# Patient Record
Sex: FEMALE | Race: OTHER | HISPANIC OR LATINO | URBAN - METROPOLITAN AREA
[De-identification: names, ages, dates, MRNs, and addresses within clinical notes are randomized per-mention and may not be internally consistent; named-entity substitution may affect disease eponyms.]

---

## 2017-01-21 ENCOUNTER — EMERGENCY (EMERGENCY)
Facility: HOSPITAL | Age: 22
LOS: 1 days | Discharge: HOME | End: 2017-01-21
Attending: EMERGENCY MEDICINE
Payer: MEDICAID

## 2017-01-21 VITALS
RESPIRATION RATE: 16 BRPM | TEMPERATURE: 98 F | SYSTOLIC BLOOD PRESSURE: 104 MMHG | OXYGEN SATURATION: 100 % | HEART RATE: 68 BPM | DIASTOLIC BLOOD PRESSURE: 63 MMHG

## 2017-01-21 VITALS
HEART RATE: 70 BPM | RESPIRATION RATE: 18 BRPM | WEIGHT: 119.93 LBS | OXYGEN SATURATION: 98 % | DIASTOLIC BLOOD PRESSURE: 76 MMHG | TEMPERATURE: 98 F | SYSTOLIC BLOOD PRESSURE: 115 MMHG | HEIGHT: 62 IN

## 2017-01-21 DIAGNOSIS — K75.9 INFLAMMATORY LIVER DISEASE, UNSPECIFIED: ICD-10-CM

## 2017-01-21 DIAGNOSIS — K59.00 CONSTIPATION, UNSPECIFIED: ICD-10-CM

## 2017-01-21 LAB
ALBUMIN SERPL ELPH-MCNC: 3.9 G/DL — SIGNIFICANT CHANGE UP (ref 3.5–5)
ALP SERPL-CCNC: 134 U/L — HIGH (ref 40–120)
ALT FLD-CCNC: 755 U/L DA — HIGH (ref 10–60)
ANION GAP SERPL CALC-SCNC: 8 MMOL/L — SIGNIFICANT CHANGE UP (ref 5–17)
APAP SERPL-MCNC: <2 UG/ML — LOW (ref 10–30)
APPEARANCE UR: ABNORMAL
AST SERPL-CCNC: 1127 U/L — HIGH (ref 10–40)
BILIRUB DIRECT SERPL-MCNC: 0.7 MG/DL — HIGH (ref 0–0.2)
BILIRUB INDIRECT FLD-MCNC: 0.6 MG/DL — SIGNIFICANT CHANGE UP (ref 0.2–1)
BILIRUB SERPL-MCNC: 1.3 MG/DL — HIGH (ref 0.2–1.2)
BILIRUB UR-MCNC: ABNORMAL
BUN SERPL-MCNC: 9 MG/DL — SIGNIFICANT CHANGE UP (ref 7–18)
CALCIUM SERPL-MCNC: 8.8 MG/DL — SIGNIFICANT CHANGE UP (ref 8.4–10.5)
CHLORIDE SERPL-SCNC: 107 MMOL/L — SIGNIFICANT CHANGE UP (ref 96–108)
CO2 SERPL-SCNC: 25 MMOL/L — SIGNIFICANT CHANGE UP (ref 22–31)
COLOR SPEC: YELLOW — SIGNIFICANT CHANGE UP
CREAT SERPL-MCNC: 0.66 MG/DL — SIGNIFICANT CHANGE UP (ref 0.5–1.3)
DIFF PNL FLD: ABNORMAL
GLUCOSE SERPL-MCNC: 102 MG/DL — HIGH (ref 70–99)
GLUCOSE UR QL: NEGATIVE — SIGNIFICANT CHANGE UP
HAV IGM SER-ACNC: SIGNIFICANT CHANGE UP
HBV CORE IGM SER-ACNC: SIGNIFICANT CHANGE UP
HBV SURFACE AG SER-ACNC: SIGNIFICANT CHANGE UP
HCG UR QL: NEGATIVE — SIGNIFICANT CHANGE UP
HCT VFR BLD CALC: 36 % — SIGNIFICANT CHANGE UP (ref 34.5–45)
HCV AB S/CO SERPL IA: 0.14 S/CO — SIGNIFICANT CHANGE UP
HCV AB SERPL-IMP: SIGNIFICANT CHANGE UP
HGB BLD-MCNC: 12.2 G/DL — SIGNIFICANT CHANGE UP (ref 11.5–15.5)
KETONES UR-MCNC: NEGATIVE — SIGNIFICANT CHANGE UP
LEUKOCYTE ESTERASE UR-ACNC: ABNORMAL
LIDOCAIN IGE QN: 203 U/L — SIGNIFICANT CHANGE UP (ref 73–393)
MCHC RBC-ENTMCNC: 31.5 PG — SIGNIFICANT CHANGE UP (ref 27–34)
MCHC RBC-ENTMCNC: 33.8 GM/DL — SIGNIFICANT CHANGE UP (ref 32–36)
MCV RBC AUTO: 93.2 FL — SIGNIFICANT CHANGE UP (ref 80–100)
NITRITE UR-MCNC: NEGATIVE — SIGNIFICANT CHANGE UP
PH UR: 6 — SIGNIFICANT CHANGE UP (ref 4.8–8)
PLATELET # BLD AUTO: 202 K/UL — SIGNIFICANT CHANGE UP (ref 150–400)
POTASSIUM SERPL-MCNC: 3.7 MMOL/L — SIGNIFICANT CHANGE UP (ref 3.5–5.3)
POTASSIUM SERPL-SCNC: 3.7 MMOL/L — SIGNIFICANT CHANGE UP (ref 3.5–5.3)
PROT SERPL-MCNC: 6.8 G/DL — SIGNIFICANT CHANGE UP (ref 6–8.3)
PROT UR-MCNC: NEGATIVE — SIGNIFICANT CHANGE UP
RBC # BLD: 3.87 M/UL — SIGNIFICANT CHANGE UP (ref 3.8–5.2)
RBC # FLD: 11.3 % — SIGNIFICANT CHANGE UP (ref 10.3–14.5)
SODIUM SERPL-SCNC: 140 MMOL/L — SIGNIFICANT CHANGE UP (ref 135–145)
SP GR SPEC: 1.02 — SIGNIFICANT CHANGE UP (ref 1.01–1.02)
UROBILINOGEN FLD QL: 8
WBC # BLD: 4.7 K/UL — SIGNIFICANT CHANGE UP (ref 3.8–10.5)
WBC # FLD AUTO: 4.7 K/UL — SIGNIFICANT CHANGE UP (ref 3.8–10.5)

## 2017-01-21 PROCEDURE — 80074 ACUTE HEPATITIS PANEL: CPT

## 2017-01-21 PROCEDURE — 81025 URINE PREGNANCY TEST: CPT

## 2017-01-21 PROCEDURE — 96375 TX/PRO/DX INJ NEW DRUG ADDON: CPT

## 2017-01-21 PROCEDURE — 96374 THER/PROPH/DIAG INJ IV PUSH: CPT

## 2017-01-21 PROCEDURE — 81001 URINALYSIS AUTO W/SCOPE: CPT

## 2017-01-21 PROCEDURE — 80048 BASIC METABOLIC PNL TOTAL CA: CPT

## 2017-01-21 PROCEDURE — 99284 EMERGENCY DEPT VISIT MOD MDM: CPT | Mod: 25

## 2017-01-21 PROCEDURE — 80307 DRUG TEST PRSMV CHEM ANLYZR: CPT

## 2017-01-21 PROCEDURE — 83690 ASSAY OF LIPASE: CPT

## 2017-01-21 PROCEDURE — 85027 COMPLETE CBC AUTOMATED: CPT

## 2017-01-21 PROCEDURE — 80076 HEPATIC FUNCTION PANEL: CPT

## 2017-01-21 RX ORDER — SODIUM CHLORIDE 9 MG/ML
1000 INJECTION INTRAMUSCULAR; INTRAVENOUS; SUBCUTANEOUS ONCE
Qty: 0 | Refills: 0 | Status: COMPLETED | OUTPATIENT
Start: 2017-01-21 | End: 2017-01-21

## 2017-01-21 RX ORDER — KETOROLAC TROMETHAMINE 30 MG/ML
30 SYRINGE (ML) INJECTION ONCE
Qty: 0 | Refills: 0 | Status: DISCONTINUED | OUTPATIENT
Start: 2017-01-21 | End: 2017-01-21

## 2017-01-21 RX ORDER — ONDANSETRON 8 MG/1
4 TABLET, FILM COATED ORAL ONCE
Qty: 0 | Refills: 0 | Status: COMPLETED | OUTPATIENT
Start: 2017-01-21 | End: 2017-01-21

## 2017-01-21 RX ADMIN — SODIUM CHLORIDE 4000 MILLILITER(S): 9 INJECTION INTRAMUSCULAR; INTRAVENOUS; SUBCUTANEOUS at 08:02

## 2017-01-21 RX ADMIN — ONDANSETRON 4 MILLIGRAM(S): 8 TABLET, FILM COATED ORAL at 05:50

## 2017-01-21 RX ADMIN — Medication 30 MILLIGRAM(S): at 08:03

## 2017-01-21 RX ADMIN — Medication 30 MILLIGRAM(S): at 05:50

## 2017-01-21 RX ADMIN — SODIUM CHLORIDE 4000 MILLILITER(S): 9 INJECTION INTRAMUSCULAR; INTRAVENOUS; SUBCUTANEOUS at 05:50

## 2017-01-21 NOTE — ED PROVIDER NOTE - OBJECTIVE STATEMENT
20 y/o F pt p/w constant abd pain radiating to back and constipation x2 days. Pt also reports nausea and vomiting and denies fever.

## 2017-01-21 NOTE — ED ADULT NURSE NOTE - OBJECTIVE STATEMENT
presented  to ed  with  abd pain radiating to back and constipation x2 days.bld.drawn and sent to lab.

## 2017-01-21 NOTE — ED ADULT TRIAGE NOTE - CHIEF COMPLAINT QUOTE
as per pt abdominal pain for 3 days,was seen by pmd 2 days ago prescrbed pepcid,but pain is getting worse,nausea no vomiting

## 2017-01-21 NOTE — ED PROVIDER NOTE - NS ED MD SCRIBE ATTENDING SCRIBE SECTIONS
PAST MEDICAL/SURGICAL/SOCIAL HISTORY/HIV/REVIEW OF SYSTEMS/HISTORY OF PRESENT ILLNESS/DISPOSITION/PHYSICAL EXAM/VITAL SIGNS( Pullset)

## 2017-01-21 NOTE — ED PROVIDER NOTE - MEDICAL DECISION MAKING DETAILS
acute hepatitis, patient nontender, well appearing, able to po hydrate. hep panel pending and will follow up. dc with anticipatory guidance and outpt follow up and return precautions.

## 2017-08-20 ENCOUNTER — EMERGENCY (EMERGENCY)
Facility: HOSPITAL | Age: 22
LOS: 1 days | Discharge: HOME | End: 2017-08-20
Attending: EMERGENCY MEDICINE
Payer: MEDICAID

## 2017-08-20 VITALS
HEIGHT: 62 IN | OXYGEN SATURATION: 100 % | WEIGHT: 110.01 LBS | DIASTOLIC BLOOD PRESSURE: 70 MMHG | SYSTOLIC BLOOD PRESSURE: 116 MMHG | RESPIRATION RATE: 16 BRPM | HEART RATE: 89 BPM | TEMPERATURE: 100 F

## 2017-08-20 DIAGNOSIS — N30.00 ACUTE CYSTITIS WITHOUT HEMATURIA: ICD-10-CM

## 2017-08-20 LAB
ALBUMIN SERPL ELPH-MCNC: 3.5 G/DL — SIGNIFICANT CHANGE UP (ref 3.5–5)
ALP SERPL-CCNC: 65 U/L — SIGNIFICANT CHANGE UP (ref 40–120)
ALT FLD-CCNC: 15 U/L DA — SIGNIFICANT CHANGE UP (ref 10–60)
ANION GAP SERPL CALC-SCNC: 8 MMOL/L — SIGNIFICANT CHANGE UP (ref 5–17)
APPEARANCE UR: CLEAR — SIGNIFICANT CHANGE UP
AST SERPL-CCNC: 16 U/L — SIGNIFICANT CHANGE UP (ref 10–40)
BILIRUB SERPL-MCNC: 0.5 MG/DL — SIGNIFICANT CHANGE UP (ref 0.2–1.2)
BILIRUB UR-MCNC: NEGATIVE — SIGNIFICANT CHANGE UP
BUN SERPL-MCNC: 6 MG/DL — LOW (ref 7–18)
CALCIUM SERPL-MCNC: 8.7 MG/DL — SIGNIFICANT CHANGE UP (ref 8.4–10.5)
CHLORIDE SERPL-SCNC: 107 MMOL/L — SIGNIFICANT CHANGE UP (ref 96–108)
CO2 SERPL-SCNC: 25 MMOL/L — SIGNIFICANT CHANGE UP (ref 22–31)
COLOR SPEC: YELLOW — SIGNIFICANT CHANGE UP
CREAT SERPL-MCNC: 0.64 MG/DL — SIGNIFICANT CHANGE UP (ref 0.5–1.3)
DIFF PNL FLD: ABNORMAL
GLUCOSE SERPL-MCNC: 104 MG/DL — HIGH (ref 70–99)
GLUCOSE UR QL: NEGATIVE — SIGNIFICANT CHANGE UP
HCG UR QL: NEGATIVE — SIGNIFICANT CHANGE UP
HCT VFR BLD CALC: 34.6 % — SIGNIFICANT CHANGE UP (ref 34.5–45)
HGB BLD-MCNC: 11.8 G/DL — SIGNIFICANT CHANGE UP (ref 11.5–15.5)
KETONES UR-MCNC: NEGATIVE — SIGNIFICANT CHANGE UP
LACTATE SERPL-SCNC: 1.2 MMOL/L — SIGNIFICANT CHANGE UP (ref 0.7–2)
LEUKOCYTE ESTERASE UR-ACNC: ABNORMAL
MCHC RBC-ENTMCNC: 31.9 PG — SIGNIFICANT CHANGE UP (ref 27–34)
MCHC RBC-ENTMCNC: 34.1 GM/DL — SIGNIFICANT CHANGE UP (ref 32–36)
MCV RBC AUTO: 93.7 FL — SIGNIFICANT CHANGE UP (ref 80–100)
NITRITE UR-MCNC: POSITIVE
PH UR: 6.5 — SIGNIFICANT CHANGE UP (ref 5–8)
PLATELET # BLD AUTO: 165 K/UL — SIGNIFICANT CHANGE UP (ref 150–400)
POTASSIUM SERPL-MCNC: 3.7 MMOL/L — SIGNIFICANT CHANGE UP (ref 3.5–5.3)
POTASSIUM SERPL-SCNC: 3.7 MMOL/L — SIGNIFICANT CHANGE UP (ref 3.5–5.3)
PROT SERPL-MCNC: 6.8 G/DL — SIGNIFICANT CHANGE UP (ref 6–8.3)
PROT UR-MCNC: 30 MG/DL
RBC # BLD: 3.69 M/UL — LOW (ref 3.8–5.2)
RBC # FLD: 10.5 % — SIGNIFICANT CHANGE UP (ref 10.3–14.5)
SODIUM SERPL-SCNC: 140 MMOL/L — SIGNIFICANT CHANGE UP (ref 135–145)
SP GR SPEC: 1.01 — SIGNIFICANT CHANGE UP (ref 1.01–1.02)
UROBILINOGEN FLD QL: NEGATIVE — SIGNIFICANT CHANGE UP
WBC # BLD: 14.2 K/UL — HIGH (ref 3.8–10.5)
WBC # FLD AUTO: 14.2 K/UL — HIGH (ref 3.8–10.5)

## 2017-08-20 PROCEDURE — 76830 TRANSVAGINAL US NON-OB: CPT | Mod: 26

## 2017-08-20 PROCEDURE — 99285 EMERGENCY DEPT VISIT HI MDM: CPT

## 2017-08-20 PROCEDURE — 76856 US EXAM PELVIC COMPLETE: CPT | Mod: 26

## 2017-08-20 RX ORDER — ONDANSETRON 8 MG/1
4 TABLET, FILM COATED ORAL ONCE
Qty: 0 | Refills: 0 | Status: COMPLETED | OUTPATIENT
Start: 2017-08-20 | End: 2017-08-20

## 2017-08-20 RX ORDER — CEFTRIAXONE 500 MG/1
INJECTION, POWDER, FOR SOLUTION INTRAMUSCULAR; INTRAVENOUS
Qty: 0 | Refills: 0 | Status: DISCONTINUED | OUTPATIENT
Start: 2017-08-21 | End: 2017-08-24

## 2017-08-20 RX ORDER — SODIUM CHLORIDE 9 MG/ML
1000 INJECTION INTRAMUSCULAR; INTRAVENOUS; SUBCUTANEOUS ONCE
Qty: 0 | Refills: 0 | Status: COMPLETED | OUTPATIENT
Start: 2017-08-20 | End: 2017-08-20

## 2017-08-20 RX ORDER — KETOROLAC TROMETHAMINE 30 MG/ML
15 SYRINGE (ML) INJECTION ONCE
Qty: 0 | Refills: 0 | Status: DISCONTINUED | OUTPATIENT
Start: 2017-08-20 | End: 2017-08-20

## 2017-08-20 RX ADMIN — ONDANSETRON 4 MILLIGRAM(S): 8 TABLET, FILM COATED ORAL at 22:37

## 2017-08-20 RX ADMIN — SODIUM CHLORIDE 1000 MILLILITER(S): 9 INJECTION INTRAMUSCULAR; INTRAVENOUS; SUBCUTANEOUS at 22:37

## 2017-08-20 NOTE — ED PROVIDER NOTE - MEDICAL DECISION MAKING DETAILS
23 y/o F with no PMHx. Concern for cystitis vs. ovarian pelvic etiology. Most likely diverticulitis, less likely appendicitis, std, toa. Will order CBC, BMP, LFTs, lactate, UA, urine, will do pelvic exam, reassess.

## 2017-08-20 NOTE — ED PROVIDER NOTE - MUSCULOSKELETAL, MLM
(-) no swelling. Spine appears normal, range of motion is not limited, no muscle or joint tenderness

## 2017-08-20 NOTE — ED PROVIDER NOTE - OBJECTIVE STATEMENT
21 y/o female (A0) with no significant PMHx presents to the ED c/o fever, nausea and vomiting x 5 days. Pt notes 3 emesis episodes yesterday, and 1 episode  today: described as NB/NB in nature.  Pt reports L sided abd pain: described as intermittent, aching in nature radiating to back, rated as 8/10 in severity. Pt denies recent outside [place] travels, sick contacts, recent Abx use, or known spoiled food consumption. Pt denies dizziness, blood in stool, melena, diarrhea, dysuria, vaginal bleeding/discharge, pain during intercourse, or any other complaints. Current smoker, denies ETOH/drug use. LMP: 17.

## 2017-08-20 NOTE — ED PROVIDER NOTE - PROGRESS NOTE DETAILS
patient symptoms improved.  likely uti. offered ct for possible stone.  had discussion with patient and mother regarding radiation and other risks.  Patient deferred at this time.  Advised patient that if she feels worse tomorrow would need to come back for a ct.  will dc with strict return precautions patient received ct, no obvious findings.  patient symptoms improved.  will dc with close followup

## 2017-08-21 VITALS
TEMPERATURE: 101 F | SYSTOLIC BLOOD PRESSURE: 117 MMHG | RESPIRATION RATE: 16 BRPM | HEART RATE: 97 BPM | DIASTOLIC BLOOD PRESSURE: 71 MMHG | OXYGEN SATURATION: 100 %

## 2017-08-21 PROCEDURE — 74176 CT ABD & PELVIS W/O CONTRAST: CPT

## 2017-08-21 PROCEDURE — 83605 ASSAY OF LACTIC ACID: CPT

## 2017-08-21 PROCEDURE — 87086 URINE CULTURE/COLONY COUNT: CPT

## 2017-08-21 PROCEDURE — 87040 BLOOD CULTURE FOR BACTERIA: CPT

## 2017-08-21 PROCEDURE — 87186 SC STD MICRODIL/AGAR DIL: CPT

## 2017-08-21 PROCEDURE — 96375 TX/PRO/DX INJ NEW DRUG ADDON: CPT

## 2017-08-21 PROCEDURE — 76830 TRANSVAGINAL US NON-OB: CPT

## 2017-08-21 PROCEDURE — 99284 EMERGENCY DEPT VISIT MOD MDM: CPT | Mod: 25

## 2017-08-21 PROCEDURE — 96374 THER/PROPH/DIAG INJ IV PUSH: CPT

## 2017-08-21 PROCEDURE — 80053 COMPREHEN METABOLIC PANEL: CPT

## 2017-08-21 PROCEDURE — 85027 COMPLETE CBC AUTOMATED: CPT

## 2017-08-21 PROCEDURE — 74176 CT ABD & PELVIS W/O CONTRAST: CPT | Mod: 26

## 2017-08-21 PROCEDURE — 76856 US EXAM PELVIC COMPLETE: CPT

## 2017-08-21 PROCEDURE — 87591 N.GONORRHOEAE DNA AMP PROB: CPT

## 2017-08-21 PROCEDURE — 81025 URINE PREGNANCY TEST: CPT

## 2017-08-21 PROCEDURE — 81001 URINALYSIS AUTO W/SCOPE: CPT

## 2017-08-21 RX ORDER — CEFTRIAXONE 500 MG/1
1 INJECTION, POWDER, FOR SOLUTION INTRAMUSCULAR; INTRAVENOUS EVERY 24 HOURS
Qty: 0 | Refills: 0 | Status: DISCONTINUED | OUTPATIENT
Start: 2017-08-22 | End: 2017-08-24

## 2017-08-21 RX ORDER — CEFTRIAXONE 500 MG/1
1 INJECTION, POWDER, FOR SOLUTION INTRAMUSCULAR; INTRAVENOUS ONCE
Qty: 0 | Refills: 0 | Status: COMPLETED | OUTPATIENT
Start: 2017-08-21 | End: 2017-08-21

## 2017-08-21 RX ORDER — KETOROLAC TROMETHAMINE 30 MG/ML
15 SYRINGE (ML) INJECTION ONCE
Qty: 0 | Refills: 0 | Status: DISCONTINUED | OUTPATIENT
Start: 2017-08-21 | End: 2017-08-21

## 2017-08-21 RX ORDER — ACETAMINOPHEN 500 MG
650 TABLET ORAL ONCE
Qty: 0 | Refills: 0 | Status: COMPLETED | OUTPATIENT
Start: 2017-08-21 | End: 2017-08-21

## 2017-08-21 RX ORDER — AZTREONAM 2 G
1 VIAL (EA) INJECTION
Qty: 14 | Refills: 0
Start: 2017-08-21 | End: 2017-08-28

## 2017-08-21 RX ADMIN — Medication 650 MILLIGRAM(S): at 03:45

## 2017-08-21 RX ADMIN — Medication 15 MILLIGRAM(S): at 00:40

## 2017-08-21 RX ADMIN — Medication 15 MILLIGRAM(S): at 01:21

## 2017-08-21 RX ADMIN — CEFTRIAXONE 100 GRAM(S): 500 INJECTION, POWDER, FOR SOLUTION INTRAMUSCULAR; INTRAVENOUS at 00:40

## 2017-08-21 NOTE — ED ADULT NURSE REASSESSMENT NOTE - NS ED NURSE REASSESS COMMENT FT1
Pt. is febrile but stated that she feels fine and wants to go home. Pt. discharged by Dr. Novak. No complaints voiced. Pt. accompanied by mother.

## 2017-08-22 LAB
N GONORRHOEA RRNA SPEC QL NAA+PROBE: SIGNIFICANT CHANGE UP
SPECIMEN SOURCE: SIGNIFICANT CHANGE UP

## 2017-08-26 LAB
CULTURE RESULTS: SIGNIFICANT CHANGE UP
SPECIMEN SOURCE: SIGNIFICANT CHANGE UP

## 2018-02-04 ENCOUNTER — EMERGENCY (EMERGENCY)
Facility: HOSPITAL | Age: 23
LOS: 1 days | Discharge: HOME | End: 2018-02-04
Attending: EMERGENCY MEDICINE
Payer: MEDICAID

## 2018-02-04 VITALS
TEMPERATURE: 97 F | DIASTOLIC BLOOD PRESSURE: 75 MMHG | RESPIRATION RATE: 20 BRPM | OXYGEN SATURATION: 100 % | HEART RATE: 63 BPM | SYSTOLIC BLOOD PRESSURE: 111 MMHG

## 2018-02-04 LAB
ALBUMIN SERPL ELPH-MCNC: 4.7 G/DL — SIGNIFICANT CHANGE UP (ref 3.5–5)
ALP SERPL-CCNC: 65 U/L — SIGNIFICANT CHANGE UP (ref 40–120)
ALT FLD-CCNC: 34 U/L DA — SIGNIFICANT CHANGE UP (ref 10–60)
ANION GAP SERPL CALC-SCNC: 8 MMOL/L — SIGNIFICANT CHANGE UP (ref 5–17)
APPEARANCE UR: CLEAR — SIGNIFICANT CHANGE UP
AST SERPL-CCNC: 69 U/L — HIGH (ref 10–40)
BASOPHILS # BLD AUTO: 0.1 K/UL — SIGNIFICANT CHANGE UP (ref 0–0.2)
BASOPHILS NFR BLD AUTO: 0.6 % — SIGNIFICANT CHANGE UP (ref 0–2)
BILIRUB SERPL-MCNC: 1.1 MG/DL — SIGNIFICANT CHANGE UP (ref 0.2–1.2)
BILIRUB UR-MCNC: NEGATIVE — SIGNIFICANT CHANGE UP
BUN SERPL-MCNC: 16 MG/DL — SIGNIFICANT CHANGE UP (ref 7–18)
CALCIUM SERPL-MCNC: 9.5 MG/DL — SIGNIFICANT CHANGE UP (ref 8.4–10.5)
CHLORIDE SERPL-SCNC: 105 MMOL/L — SIGNIFICANT CHANGE UP (ref 96–108)
CO2 SERPL-SCNC: 25 MMOL/L — SIGNIFICANT CHANGE UP (ref 22–31)
COLOR SPEC: YELLOW — SIGNIFICANT CHANGE UP
CREAT SERPL-MCNC: 0.89 MG/DL — SIGNIFICANT CHANGE UP (ref 0.5–1.3)
DIFF PNL FLD: NEGATIVE — SIGNIFICANT CHANGE UP
EOSINOPHIL # BLD AUTO: 0 K/UL — SIGNIFICANT CHANGE UP (ref 0–0.5)
EOSINOPHIL NFR BLD AUTO: 0.3 % — SIGNIFICANT CHANGE UP (ref 0–6)
GLUCOSE SERPL-MCNC: 100 MG/DL — HIGH (ref 70–99)
GLUCOSE UR QL: NEGATIVE — SIGNIFICANT CHANGE UP
HCG SERPL-ACNC: <1 MIU/ML — SIGNIFICANT CHANGE UP
HCT VFR BLD CALC: 40.1 % — SIGNIFICANT CHANGE UP (ref 34.5–45)
HGB BLD-MCNC: 13.6 G/DL — SIGNIFICANT CHANGE UP (ref 11.5–15.5)
KETONES UR-MCNC: ABNORMAL
LEUKOCYTE ESTERASE UR-ACNC: ABNORMAL
LIDOCAIN IGE QN: 155 U/L — SIGNIFICANT CHANGE UP (ref 73–393)
LYMPHOCYTES # BLD AUTO: 1.6 K/UL — SIGNIFICANT CHANGE UP (ref 1–3.3)
LYMPHOCYTES # BLD AUTO: 14.2 % — SIGNIFICANT CHANGE UP (ref 13–44)
MCHC RBC-ENTMCNC: 32.1 PG — SIGNIFICANT CHANGE UP (ref 27–34)
MCHC RBC-ENTMCNC: 33.8 GM/DL — SIGNIFICANT CHANGE UP (ref 32–36)
MCV RBC AUTO: 95 FL — SIGNIFICANT CHANGE UP (ref 80–100)
MONOCYTES # BLD AUTO: 0.6 K/UL — SIGNIFICANT CHANGE UP (ref 0–0.9)
MONOCYTES NFR BLD AUTO: 5.6 % — SIGNIFICANT CHANGE UP (ref 2–14)
NEUTROPHILS # BLD AUTO: 8.8 K/UL — HIGH (ref 1.8–7.4)
NEUTROPHILS NFR BLD AUTO: 79.3 % — HIGH (ref 43–77)
NITRITE UR-MCNC: NEGATIVE — SIGNIFICANT CHANGE UP
PH UR: 8 — SIGNIFICANT CHANGE UP (ref 5–8)
PLATELET # BLD AUTO: 217 K/UL — SIGNIFICANT CHANGE UP (ref 150–400)
POTASSIUM SERPL-MCNC: 3.8 MMOL/L — SIGNIFICANT CHANGE UP (ref 3.5–5.3)
POTASSIUM SERPL-SCNC: 3.8 MMOL/L — SIGNIFICANT CHANGE UP (ref 3.5–5.3)
PROT SERPL-MCNC: 7.9 G/DL — SIGNIFICANT CHANGE UP (ref 6–8.3)
PROT UR-MCNC: 15
RBC # BLD: 4.22 M/UL — SIGNIFICANT CHANGE UP (ref 3.8–5.2)
RBC # FLD: 11.3 % — SIGNIFICANT CHANGE UP (ref 10.3–14.5)
SODIUM SERPL-SCNC: 138 MMOL/L — SIGNIFICANT CHANGE UP (ref 135–145)
SP GR SPEC: 1.01 — SIGNIFICANT CHANGE UP (ref 1.01–1.02)
UROBILINOGEN FLD QL: 4
WBC # BLD: 11 K/UL — HIGH (ref 3.8–10.5)
WBC # FLD AUTO: 11 K/UL — HIGH (ref 3.8–10.5)

## 2018-02-04 PROCEDURE — 84702 CHORIONIC GONADOTROPIN TEST: CPT

## 2018-02-04 PROCEDURE — 85027 COMPLETE CBC AUTOMATED: CPT

## 2018-02-04 PROCEDURE — 81001 URINALYSIS AUTO W/SCOPE: CPT

## 2018-02-04 PROCEDURE — 76705 ECHO EXAM OF ABDOMEN: CPT | Mod: 26

## 2018-02-04 PROCEDURE — 76705 ECHO EXAM OF ABDOMEN: CPT

## 2018-02-04 PROCEDURE — 80053 COMPREHEN METABOLIC PANEL: CPT

## 2018-02-04 PROCEDURE — 96375 TX/PRO/DX INJ NEW DRUG ADDON: CPT

## 2018-02-04 PROCEDURE — 99283 EMERGENCY DEPT VISIT LOW MDM: CPT

## 2018-02-04 PROCEDURE — 96374 THER/PROPH/DIAG INJ IV PUSH: CPT

## 2018-02-04 PROCEDURE — 99284 EMERGENCY DEPT VISIT MOD MDM: CPT | Mod: 25

## 2018-02-04 PROCEDURE — 83690 ASSAY OF LIPASE: CPT

## 2018-02-04 RX ORDER — ONDANSETRON 8 MG/1
4 TABLET, FILM COATED ORAL ONCE
Qty: 0 | Refills: 0 | Status: COMPLETED | OUTPATIENT
Start: 2018-02-04 | End: 2018-02-04

## 2018-02-04 RX ORDER — MORPHINE SULFATE 50 MG/1
4 CAPSULE, EXTENDED RELEASE ORAL ONCE
Qty: 0 | Refills: 0 | Status: DISCONTINUED | OUTPATIENT
Start: 2018-02-04 | End: 2018-02-04

## 2018-02-04 RX ORDER — SODIUM CHLORIDE 9 MG/ML
1000 INJECTION INTRAMUSCULAR; INTRAVENOUS; SUBCUTANEOUS ONCE
Qty: 0 | Refills: 0 | Status: COMPLETED | OUTPATIENT
Start: 2018-02-04 | End: 2018-02-04

## 2018-02-04 RX ORDER — KETOROLAC TROMETHAMINE 30 MG/ML
30 SYRINGE (ML) INJECTION ONCE
Qty: 0 | Refills: 0 | Status: DISCONTINUED | OUTPATIENT
Start: 2018-02-04 | End: 2018-02-04

## 2018-02-04 RX ADMIN — MORPHINE SULFATE 4 MILLIGRAM(S): 50 CAPSULE, EXTENDED RELEASE ORAL at 17:50

## 2018-02-04 RX ADMIN — MORPHINE SULFATE 4 MILLIGRAM(S): 50 CAPSULE, EXTENDED RELEASE ORAL at 17:17

## 2018-02-04 RX ADMIN — SODIUM CHLORIDE 1000 MILLILITER(S): 9 INJECTION INTRAMUSCULAR; INTRAVENOUS; SUBCUTANEOUS at 17:17

## 2018-02-04 RX ADMIN — ONDANSETRON 4 MILLIGRAM(S): 8 TABLET, FILM COATED ORAL at 17:16

## 2018-02-04 NOTE — ED ADULT NURSE NOTE - OBJECTIVE STATEMENT
pt a&ox3, here with c/o abdominal pain. pt states midabdominal pain since yesterday. pt loud and tearful in ED. 20G placed by SO Boykin and medicated. due for CT abdomen.

## 2018-02-04 NOTE — ED PROVIDER NOTE - MEDICAL DECISION MAKING DETAILS
23 y/o F pt with abd pain and h/o stones. Will obtain labs and CT. Will control pain, give IV fluids, and reassess. 23 y/o F pt with abd pain and h/o kidney stones.  Will control pain, give IV fluids, and reassess.  pain resolved. sono reveals cholelithiasis. home with primary care physician fredy

## 2018-02-04 NOTE — ED PROVIDER NOTE - OBJECTIVE STATEMENT
23 y/o F pt with no significant PMHx presents to ED c/o sudden onset of R sided abd pain, R sided back pain and vomiting x 1 day. Pt denies diarrhea, fever, chills, or any other complaints. LMP 1 week ago.

## 2018-02-04 NOTE — ED ADULT NURSE NOTE - ED STAT RN HANDOFF DETAILS
endorsed to SO Leslie in G2 in stable condition for continuation of care. pt a&ox3, here for abdominal pain. 20G RAC. pending MD dispo.

## 2018-08-28 ENCOUNTER — APPOINTMENT (OUTPATIENT)
Dept: OBGYN | Facility: CLINIC | Age: 23
End: 2018-08-28

## 2019-01-19 ENCOUNTER — TRANSCRIPTION ENCOUNTER (OUTPATIENT)
Age: 24
End: 2019-01-19

## 2021-07-06 ENCOUNTER — EMERGENCY (EMERGENCY)
Facility: HOSPITAL | Age: 26
LOS: 1 days | Discharge: LEFT BEFORE DISCHARGE | End: 2021-07-06
Attending: EMERGENCY MEDICINE
Payer: MEDICAID

## 2021-07-06 VITALS
HEART RATE: 82 BPM | WEIGHT: 145.06 LBS | HEIGHT: 62 IN | OXYGEN SATURATION: 96 % | RESPIRATION RATE: 17 BRPM | DIASTOLIC BLOOD PRESSURE: 73 MMHG | TEMPERATURE: 97 F | SYSTOLIC BLOOD PRESSURE: 115 MMHG

## 2021-07-06 PROCEDURE — 99284 EMERGENCY DEPT VISIT MOD MDM: CPT

## 2021-07-06 PROCEDURE — 99285 EMERGENCY DEPT VISIT HI MDM: CPT

## 2021-07-06 RX ORDER — SODIUM CHLORIDE 9 MG/ML
1000 INJECTION INTRAMUSCULAR; INTRAVENOUS; SUBCUTANEOUS ONCE
Refills: 0 | Status: COMPLETED | OUTPATIENT
Start: 2021-07-06 | End: 2021-07-06

## 2021-07-06 RX ORDER — NALOXONE HYDROCHLORIDE 4 MG/.1ML
4 SPRAY NASAL
Qty: 0.1 | Refills: 0
Start: 2021-07-06

## 2021-07-06 NOTE — ED PROVIDER NOTE - PATIENT PORTAL LINK FT
You can access the FollowMyHealth Patient Portal offered by [facility] by registering at the following website: [email or web address]. By joining [employer]’s FollowMyHealth portal, you will also be able to view your health information using other applications (apps) compatible with our system.

## 2021-07-06 NOTE — ED PROVIDER NOTE - NSFOLLOWUPINSTRUCTIONS_ED_ALL_ED_FT
Please follow up with your primary care doctor in 1-2 days.  Please use the narcan if she becomes poorly responsive or not breathing well.  Please return to the emergency department if you have shortness of breath, change in mental status, vomiting, fever, or any other symptoms.      Intentional Drug Overdose       An intentional drug overdose refers to taking too much of a drug to get high or for the purpose of harming yourself. An overdose can occur with illegal drugs, prescription medicines, or over-the-counter (OTC) medicines.    The effects of an intentional drug overdose can be mild, dangerous, or even deadly.      What are the causes?  A drug overdose is caused by taking too much of a drug. Drugs that commonly cause overdose include:  •Pain medicines.      •Medicines to treat mental health conditions.      •Illegal drugs.        What increases the risk?  The risk of a drug overdose is higher for someone who:  •Takes illegal drugs.      •Takes a drug and drinks alcohol.      •Takes multiple drugs.      •Has a mental health condition.        What are the signs or symptoms?  Symptoms of a drug overdose depend on the drug and the amount that was taken. Common danger signs include:  •Behavior changes.      •Sleepiness.      •Slowed breathing.      •Nausea and vomiting.      •Seizures.      •Changes in eye pupil size. The pupil may be very large or very small.    If there are signs and symptoms of very low blood pressure (shock) from an overdose, emergency treatment is required. These include:  •Cold and clammy skin.      •Pale skin.      •Blue lips.      •Very slow breathing.      •Extreme sleepiness.      •Loss of consciousness.        How is this diagnosed?  This condition may be diagnosed based on your symptoms. It is important to tell your health care provider:  •All of the drugs that you took.      •When you took the drugs.      •Whether you were drinking alcohol.    Your health care provider will do a physical exam. This exam may include:  •Checking and monitoring your heart rate and rhythm, your temperature, and your blood pressure (vital signs).      •Checking your breathing and oxygen level.      You may also have tests, including urine or blood tests.      How is this treated?  This condition requires immediate medical treatment and hospitalization. Treatment will focus on supporting normal body functions, such as breathing, and preventing complications. Treatment may include:  •Giving fluids and electrolytes through an IV.      •Inserting a breathing tube in your airway to help you breathe.      •Passing a tube through your nose and into your stomach (nasogastric tube, NG tube) to wash out your stomach.      •Filtering your blood through an artificial kidney machine (hemodialysis).      •Ongoing counseling and mental health support.    •Giving medicines that:  •Make you vomit.      •Absorb any medicine that is left in your digestive system.      •Block or reverse the effect of the drug that caused the overdose (antidote).          Follow these instructions at home:    Medicines     •Take over-the-counter and prescription medicines only as told by your health care provider. Always ask your health care provider about possible side effects of any new drug that you start taking.      •Keep a list of all the drugs that you take, including over-the-counter medicines. Bring this list with you to all your medical visits.      Alcohol use   • Do not drink alcohol if:  •Your health care provider tells you not to drink.      •You are pregnant, may be pregnant, or are planning to become pregnant.      •If you drink alcohol, limit how much you have:  •0–1 drink a day for women.      •0–2 drinks a day for men.        •Be aware of how much alcohol is in your drink. In the [place] one drink equals one typical bottle of beer (12 oz), one-half glass of wine (5 oz), or one shot of hard liquor (1½ oz).        General instructions      •Drink enough fluid to keep your urine pale yellow.      •If you are working with a counselor or mental health professional, make sure to follow his or her instructions.      •Keep all follow-up visits as told by your health care provider. This is important.      Where to find support     •If you think that you are addicted to a drug or that you have a problem with drug use, you may benefit from receiving support for quitting from a local support group or counselor. Ask your health care provider for a referral to these resources in your area.        How is this prevented?  •Get help if you are struggling with:  •Alcohol or drug use.      •Depression or another mental health problem.        •Keep the phone number of your local poison control center near your phone or on your cell phone.      •Read the drug inserts that come with your medicines.      • Do not use illegal drugs.      • Do not drink alcohol when taking drugs.      • Do not take medicines that are not prescribed for you.        Contact a health care provider if:    •Your symptoms return.      •You develop new symptoms or side effects when you take medicines.        Get help right away if:    •You think that you or someone else may have taken too much of a drug. The hotline of the American Association of Poison Control Centers is (179) 109-0331.      •You or someone else is having symptoms of a drug overdose.      •You have serious thoughts about hurting yourself or others.      •You become confused.    •You have:  •Chest pain.      •Trouble breathing.      •A loss of consciousness.        Drug overdose is an emergency. Do not wait to see if the symptoms will go away. Get medical help right away. Call your local emergency services (920 in the [place] Do not drive yourself to the hospital.   If you ever feel like you may hurt yourself or others, or have thoughts about taking your own life, get help right away. You can go to the nearest emergency department or call:   • Your local emergency services (958 in the [place]       • A suicide crisis helpline, such as the National Suicide Prevention Lifeline at 1-774.739.6115. This is open 24 hours a day.          Summary    •A drug overdose happens when you take too much of a drug.      •An overdose can occur with illegal drugs, prescription medicines, or over-the-counter (OTC) medicines.      •This condition requires immediate medical treatment and hospitalization.      This information is not intended to replace advice given to you by your health care provider. Make sure you discuss any questions you have with your health care provider.

## 2021-07-06 NOTE — ED PROVIDER NOTE - OBJECTIVE STATEMENT
26yoF prev healthy presents with concern for overdose. Per boyfriend Bennie Rivero at bedside she was not waking up this morning. Per EMS found unresponsive, RR 4-6, given narcan 2mg IV with immediate awakening. Pt states she took some sleeping pills last night because she could not sleep, no intent to harm herself, denies opiate use. States smokes THC and has used Xanax in the past. Denies all pain and symptoms. On arrival and after short assessment period pt yelling vehemently to remove IV and get her out.

## 2021-07-06 NOTE — ED ADULT NURSE NOTE - NSFALLRSKASSISTTYPE_ED_ALL_ED
[Follow-Up: _____] : a [unfilled] follow-up visit  [Patient] : patient [Medical Records] : medical records [Mother] : mother Standing

## 2021-07-06 NOTE — ED ADULT NURSE NOTE - OBJECTIVE STATEMENT
Pt BIBA from home, found unresponsive by boyfriend. As per boyfriend, pt took unknown amount or type of pills. Narcan given on the field by EMS, pt aox3, fully awake on arrival to ED. Refuses care. No sign of respiratory distress noted. vital WNL.

## 2021-07-06 NOTE — ED PROVIDER NOTE - PHYSICAL EXAMINATION
19-Jun-2018 21:31 Afebrile, hemodynamically stable, saturating well  Diaphoretic, yelling  Head NCAT  PERRL 3-2mm b/l, EOMI, anicteric  MMM  RRR, nml S1/S2, no m/r/g  Lungs CTAB, no w/r/r  Abd soft, NT, ND, nml BS, no rebound or guarding  AAO, CN's 3-12 grossly intact  DURAN spontaneously, no leg cyanosis or edema  Skin warm, well perfused

## 2021-07-06 NOTE — ED PROVIDER NOTE - CLINICAL SUMMARY MEDICAL DECISION MAKING FREE TEXT BOX
No e/o intoxication at this time. Low suspicion for suicidal intent. Pt does not want medication or further assessment. I had extensive discussion of risks and benefits of pursuing further medical evaluation and/or care with patient and boyfriend; patient still electing to leave against medical advice. Patient is awake, alert, oriented and demonstrates full capacity and insight into illness. Aware of concern for to re-narcosis and damage to herself or her baby, permanent brain damage, death. Patient aware and encouraged to return immediately to ED or nearest ED if patient decides to change mind regarding care or if patient experiences any new, worsening, or concerning symptoms. Boyfriend as well states he knows how to use narcan and will not let her out of his sight for the next 36 hours. No narcan in our Pyxis, will prescribe it. Patient is well appearing, NAD, afebrile, hemodynamically stable, vitals nml. Discharged with instructions in further symptomatic care, return precautions.

## 2021-12-22 ENCOUNTER — LABORATORY RESULT (OUTPATIENT)
Age: 26
End: 2021-12-22

## 2021-12-23 ENCOUNTER — APPOINTMENT (OUTPATIENT)
Dept: OBGYN | Facility: CLINIC | Age: 26
End: 2021-12-23
Payer: MEDICAID

## 2021-12-23 VITALS — DIASTOLIC BLOOD PRESSURE: 71 MMHG | WEIGHT: 105 LBS | SYSTOLIC BLOOD PRESSURE: 104 MMHG

## 2021-12-23 PROCEDURE — 99204 OFFICE O/P NEW MOD 45 MIN: CPT

## 2021-12-28 ENCOUNTER — LABORATORY RESULT (OUTPATIENT)
Age: 26
End: 2021-12-28

## 2021-12-29 ENCOUNTER — NON-APPOINTMENT (OUTPATIENT)
Age: 26
End: 2021-12-29

## 2021-12-29 DIAGNOSIS — O23.40 UNSPECIFIED INFECTION OF URINARY TRACT IN PREGNANCY, UNSPECIFIED TRIMESTER: ICD-10-CM

## 2021-12-29 RX ORDER — NITROFURANTOIN (MONOHYDRATE/MACROCRYSTALS) 25; 75 MG/1; MG/1
100 CAPSULE ORAL
Qty: 10 | Refills: 0 | Status: ACTIVE | COMMUNITY
Start: 2021-12-29 | End: 1900-01-01

## 2022-01-05 NOTE — ED PROVIDER NOTE - CARDIAC PEDAL EDEMA
1  Drink plenty fluids  2   Take probiotics [i e  Yogurt, Acidophilus, Florastor (liquid)] daily  3   Over-the-counter medications as needed for symptomatic care  4    Advance activities as tolerated  5    Follow-up with your primary care physician in 3-4 days  6   Go to emergency room if symptoms are worsening  absent

## 2022-01-06 ENCOUNTER — APPOINTMENT (OUTPATIENT)
Dept: OBGYN | Facility: CLINIC | Age: 27
End: 2022-01-06

## 2022-01-08 ENCOUNTER — NON-APPOINTMENT (OUTPATIENT)
Age: 27
End: 2022-01-08

## 2022-01-08 ENCOUNTER — INPATIENT (INPATIENT)
Facility: HOSPITAL | Age: 27
LOS: 1 days | Discharge: HOME | End: 2022-01-10
Attending: OBSTETRICS & GYNECOLOGY | Admitting: OBSTETRICS & GYNECOLOGY
Payer: MEDICAID

## 2022-01-08 VITALS — WEIGHT: 119.93 LBS | HEIGHT: 62 IN

## 2022-01-08 DIAGNOSIS — Z3A.00 WEEKS OF GESTATION OF PREGNANCY NOT SPECIFIED: ICD-10-CM

## 2022-01-08 DIAGNOSIS — Z34.80 ENCOUNTER FOR SUPERVISION OF OTHER NORMAL PREGNANCY, UNSPECIFIED TRIMESTER: ICD-10-CM

## 2022-01-08 DIAGNOSIS — O26.899 OTHER SPECIFIED PREGNANCY RELATED CONDITIONS, UNSPECIFIED TRIMESTER: ICD-10-CM

## 2022-01-08 LAB
AMNISURE ROM (RUPTURE OF MEMBRANES): POSITIVE
APTT BLD: 30 SEC — SIGNIFICANT CHANGE UP (ref 27.5–35.5)
BASOPHILS # BLD AUTO: 0.01 K/UL — SIGNIFICANT CHANGE UP (ref 0–0.2)
BASOPHILS NFR BLD AUTO: 0.2 % — SIGNIFICANT CHANGE UP (ref 0–2)
EOSINOPHIL # BLD AUTO: 0.07 K/UL — SIGNIFICANT CHANGE UP (ref 0–0.5)
EOSINOPHIL NFR BLD AUTO: 1.3 % — SIGNIFICANT CHANGE UP (ref 0–6)
FIBRINOGEN PPP-MCNC: 596 MG/DL — HIGH (ref 290–520)
HCT VFR BLD CALC: 28.3 % — LOW (ref 34.5–45)
HGB BLD-MCNC: 9.2 G/DL — LOW (ref 11.5–15.5)
HIV 1 & 2 AB SERPL IA.RAPID: SIGNIFICANT CHANGE UP
IMM GRANULOCYTES NFR BLD AUTO: 0.4 % — SIGNIFICANT CHANGE UP (ref 0–1.5)
INR BLD: 0.93 RATIO — SIGNIFICANT CHANGE UP (ref 0.88–1.16)
LYMPHOCYTES # BLD AUTO: 2.27 K/UL — SIGNIFICANT CHANGE UP (ref 1–3.3)
LYMPHOCYTES # BLD AUTO: 42.8 % — SIGNIFICANT CHANGE UP (ref 13–44)
MCHC RBC-ENTMCNC: 28.5 PG — SIGNIFICANT CHANGE UP (ref 27–34)
MCHC RBC-ENTMCNC: 32.5 GM/DL — SIGNIFICANT CHANGE UP (ref 32–36)
MCV RBC AUTO: 87.6 FL — SIGNIFICANT CHANGE UP (ref 80–100)
MONOCYTES # BLD AUTO: 0.29 K/UL — SIGNIFICANT CHANGE UP (ref 0–0.9)
MONOCYTES NFR BLD AUTO: 5.5 % — SIGNIFICANT CHANGE UP (ref 2–14)
NEUTROPHILS # BLD AUTO: 2.64 K/UL — SIGNIFICANT CHANGE UP (ref 1.8–7.4)
NEUTROPHILS NFR BLD AUTO: 49.8 % — SIGNIFICANT CHANGE UP (ref 43–77)
NRBC # BLD: 0 /100 WBCS — SIGNIFICANT CHANGE UP (ref 0–0)
PCP SPEC-MCNC: SIGNIFICANT CHANGE UP
PLATELET # BLD AUTO: 313 K/UL — SIGNIFICANT CHANGE UP (ref 150–400)
PROTHROM AB SERPL-ACNC: 11.1 SEC — SIGNIFICANT CHANGE UP (ref 10.6–13.6)
RBC # BLD: 3.23 M/UL — LOW (ref 3.8–5.2)
RBC # FLD: 13.8 % — SIGNIFICANT CHANGE UP (ref 10.3–14.5)
SARS-COV-2 RNA SPEC QL NAA+PROBE: DETECTED
WBC # BLD: 5.3 K/UL — SIGNIFICANT CHANGE UP (ref 3.8–10.5)
WBC # FLD AUTO: 5.3 K/UL — SIGNIFICANT CHANGE UP (ref 3.8–10.5)

## 2022-01-08 PROCEDURE — 59409 OBSTETRICAL CARE: CPT | Mod: U7

## 2022-01-08 RX ORDER — AMPICILLIN TRIHYDRATE 250 MG
1 CAPSULE ORAL EVERY 4 HOURS
Refills: 0 | Status: DISCONTINUED | OUTPATIENT
Start: 2022-01-08 | End: 2022-01-09

## 2022-01-08 RX ORDER — CITRIC ACID/SODIUM CITRATE 300-500 MG
15 SOLUTION, ORAL ORAL EVERY 6 HOURS
Refills: 0 | Status: DISCONTINUED | OUTPATIENT
Start: 2022-01-08 | End: 2022-01-09

## 2022-01-08 RX ORDER — AMPICILLIN TRIHYDRATE 250 MG
2 CAPSULE ORAL ONCE
Refills: 0 | Status: COMPLETED | OUTPATIENT
Start: 2022-01-08 | End: 2022-01-08

## 2022-01-08 RX ORDER — SODIUM CHLORIDE 9 MG/ML
1000 INJECTION, SOLUTION INTRAVENOUS
Refills: 0 | Status: DISCONTINUED | OUTPATIENT
Start: 2022-01-08 | End: 2022-01-09

## 2022-01-08 RX ADMIN — Medication 216 GRAM(S): at 19:58

## 2022-01-08 RX ADMIN — Medication 12 MILLIGRAM(S): at 21:50

## 2022-01-08 RX ADMIN — SODIUM CHLORIDE 125 MILLILITER(S): 9 INJECTION, SOLUTION INTRAVENOUS at 20:20

## 2022-01-08 NOTE — PATIENT PROFILE OB - FALL HARM RISK - UNIVERSAL INTERVENTIONS
Bed in lowest position, wheels locked, appropriate side rails in place/Call bell, personal items and telephone in reach/Instruct patient to call for assistance before getting out of bed or chair/Non-slip footwear when patient is out of bed/Hallie to call system/Physically safe environment - no spills, clutter or unnecessary equipment/Purposeful Proactive Rounding/Room/bathroom lighting operational, light cord in reach

## 2022-01-09 ENCOUNTER — RESULT REVIEW (OUTPATIENT)
Age: 27
End: 2022-01-09

## 2022-01-09 ENCOUNTER — TRANSCRIPTION ENCOUNTER (OUTPATIENT)
Age: 27
End: 2022-01-09

## 2022-01-09 LAB
HBV SURFACE AG SERPL QL IA: SIGNIFICANT CHANGE UP
HIV 1+2 AB+HIV1 P24 AG SERPL QL IA: SIGNIFICANT CHANGE UP
RUBV IGG SER-ACNC: 3 INDEX — SIGNIFICANT CHANGE UP
RUBV IGG SER-IMP: POSITIVE — SIGNIFICANT CHANGE UP
T PALLIDUM AB TITR SER: NEGATIVE — SIGNIFICANT CHANGE UP

## 2022-01-09 PROCEDURE — 88307 TISSUE EXAM BY PATHOLOGIST: CPT | Mod: 26

## 2022-01-09 RX ORDER — HYDROCORTISONE 1 %
1 OINTMENT (GRAM) TOPICAL EVERY 6 HOURS
Refills: 0 | Status: DISCONTINUED | OUTPATIENT
Start: 2022-01-09 | End: 2022-01-10

## 2022-01-09 RX ORDER — DIBUCAINE 1 %
1 OINTMENT (GRAM) RECTAL EVERY 6 HOURS
Refills: 0 | Status: DISCONTINUED | OUTPATIENT
Start: 2022-01-09 | End: 2022-01-10

## 2022-01-09 RX ORDER — DIPHENHYDRAMINE HCL 50 MG
25 CAPSULE ORAL EVERY 6 HOURS
Refills: 0 | Status: DISCONTINUED | OUTPATIENT
Start: 2022-01-09 | End: 2022-01-10

## 2022-01-09 RX ORDER — SIMETHICONE 80 MG/1
80 TABLET, CHEWABLE ORAL EVERY 4 HOURS
Refills: 0 | Status: DISCONTINUED | OUTPATIENT
Start: 2022-01-09 | End: 2022-01-10

## 2022-01-09 RX ORDER — OXYTOCIN 10 UNIT/ML
6 VIAL (ML) INJECTION
Qty: 30 | Refills: 0 | Status: DISCONTINUED | OUTPATIENT
Start: 2022-01-09 | End: 2022-01-09

## 2022-01-09 RX ORDER — MAGNESIUM HYDROXIDE 400 MG/1
30 TABLET, CHEWABLE ORAL
Refills: 0 | Status: DISCONTINUED | OUTPATIENT
Start: 2022-01-09 | End: 2022-01-10

## 2022-01-09 RX ORDER — PRAMOXINE HYDROCHLORIDE 150 MG/15G
1 AEROSOL, FOAM RECTAL EVERY 4 HOURS
Refills: 0 | Status: DISCONTINUED | OUTPATIENT
Start: 2022-01-09 | End: 2022-01-10

## 2022-01-09 RX ORDER — OXYTOCIN 10 UNIT/ML
333.33 VIAL (ML) INJECTION
Qty: 20 | Refills: 0 | Status: DISCONTINUED | OUTPATIENT
Start: 2022-01-09 | End: 2022-01-10

## 2022-01-09 RX ORDER — TETANUS TOXOID, REDUCED DIPHTHERIA TOXOID AND ACELLULAR PERTUSSIS VACCINE, ADSORBED 5; 2.5; 8; 8; 2.5 [IU]/.5ML; [IU]/.5ML; UG/.5ML; UG/.5ML; UG/.5ML
0.5 SUSPENSION INTRAMUSCULAR ONCE
Refills: 0 | Status: DISCONTINUED | OUTPATIENT
Start: 2022-01-09 | End: 2022-01-10

## 2022-01-09 RX ORDER — BENZOCAINE 10 %
1 GEL (GRAM) MUCOUS MEMBRANE EVERY 6 HOURS
Refills: 0 | Status: DISCONTINUED | OUTPATIENT
Start: 2022-01-09 | End: 2022-01-10

## 2022-01-09 RX ORDER — OXYTOCIN 10 UNIT/ML
2 VIAL (ML) INJECTION
Qty: 30 | Refills: 0 | Status: DISCONTINUED | OUTPATIENT
Start: 2022-01-09 | End: 2022-01-10

## 2022-01-09 RX ORDER — ACETAMINOPHEN 500 MG
650 TABLET ORAL EVERY 6 HOURS
Refills: 0 | Status: DISCONTINUED | OUTPATIENT
Start: 2022-01-09 | End: 2022-01-10

## 2022-01-09 RX ORDER — SODIUM CHLORIDE 9 MG/ML
3 INJECTION INTRAMUSCULAR; INTRAVENOUS; SUBCUTANEOUS EVERY 8 HOURS
Refills: 0 | Status: DISCONTINUED | OUTPATIENT
Start: 2022-01-09 | End: 2022-01-10

## 2022-01-09 RX ORDER — IBUPROFEN 200 MG
600 TABLET ORAL EVERY 6 HOURS
Refills: 0 | Status: DISCONTINUED | OUTPATIENT
Start: 2022-01-09 | End: 2022-01-10

## 2022-01-09 RX ORDER — LANOLIN
1 OINTMENT (GRAM) TOPICAL EVERY 6 HOURS
Refills: 0 | Status: DISCONTINUED | OUTPATIENT
Start: 2022-01-09 | End: 2022-01-10

## 2022-01-09 RX ORDER — AER TRAVELER 0.5 G/1
1 SOLUTION RECTAL; TOPICAL EVERY 4 HOURS
Refills: 0 | Status: DISCONTINUED | OUTPATIENT
Start: 2022-01-09 | End: 2022-01-10

## 2022-01-09 RX ADMIN — Medication 1000 MILLIUNIT(S)/MIN: at 09:32

## 2022-01-09 RX ADMIN — Medication 108 GRAM(S): at 04:00

## 2022-01-09 RX ADMIN — SODIUM CHLORIDE 3 MILLILITER(S): 9 INJECTION INTRAMUSCULAR; INTRAVENOUS; SUBCUTANEOUS at 22:48

## 2022-01-09 RX ADMIN — Medication 600 MILLIGRAM(S): at 18:28

## 2022-01-09 RX ADMIN — Medication 650 MILLIGRAM(S): at 20:00

## 2022-01-09 RX ADMIN — Medication 1 TABLET(S): at 12:25

## 2022-01-09 RX ADMIN — Medication 108 GRAM(S): at 00:00

## 2022-01-09 RX ADMIN — Medication 650 MILLIGRAM(S): at 19:30

## 2022-01-09 RX ADMIN — SODIUM CHLORIDE 3 MILLILITER(S): 9 INJECTION INTRAMUSCULAR; INTRAVENOUS; SUBCUTANEOUS at 14:38

## 2022-01-09 NOTE — DISCHARGE NOTE OB - PATIENT PORTAL LINK FT
You can access the FollowMyHealth Patient Portal offered by [facility] by registering at the following website: [email or web address]. By joining [employer]’s FollowMyHealth portal, you will also be able to view your health information using other applications (apps) compatible with our system.

## 2022-01-09 NOTE — DISCHARGE NOTE OB - CARE PROVIDERS DIRECT ADDRESSES
[email or web address]

## 2022-01-09 NOTE — DISCHARGE NOTE OB - MEDICATION SUMMARY - MEDICATIONS TO STOP TAKING
I will STOP taking the medications listed below when I get home from the hospital:    Bactrim  mg-160 mg oral tablet  -- 1 tab(s) by mouth 2 times a day  -- Avoid prolonged or excessive exposure to direct and/or artificial sunlight while taking this medication.  Finish all this medication unless otherwise directed by prescriber.  Medication should be taken with plenty of water.    Narcan 4 mg/0.1 mL nasal spray  -- 4 milligram(s) intranasally once as needed for change in mental status or difficulty breathing  -- For the nose.

## 2022-01-09 NOTE — DISCHARGE NOTE OB - PROVIDER TOKENS
PROVIDER:[TOKEN:[[id number]],FOLLOWUP:[1 month]] PROVIDER:[TOKEN:[[id number]],FOLLOWUP:[1 month]],FREE:[LAST:[[facility]],PHONE:[phone]],FAX:[(   )    -]]

## 2022-01-09 NOTE — DISCHARGE NOTE OB - CARE PROVIDER_API CALL
Mitch Umanzor  OBSTETRICS AND GYNECOLOGY  [address], [place] [address]  Phone: [phone]  Fax: [phone]  Follow Up Time: 1 month   Mitch Umanzor  OBSTETRICS AND GYNECOLOGY  [address], [place] [address]  Phone: [phone]  Fax: [phone]  Follow Up Time: 1 month    [facility],   Phone: [phone]  Fax: (   )    -  Follow Up Time:

## 2022-01-09 NOTE — DISCHARGE NOTE OB - CARE PLAN
1 Principal Discharge DX:	Delivery normal  Assessment and plan of treatment:	Continue breastfeeding.  Motrin as needed for pain.  Nothing per vagina x 6 weeks - no sex, tampons, douching, tub baths, etc.  Follow up in office in 5-6 weeks for check up

## 2022-01-09 NOTE — DISCHARGE NOTE OB - HOSPITAL COURSE
Patient is a 26 year old  at 36wks who presents with PPROM.   S/p .  COVID Positive - precautions used.  Utox positive - seen by social work.  Uncomplicated postpartum care.  Patient is a 26 year old  at 36wks who presents with PPROM.   S/p .  COVID Positive - precautions used.  Utox positive - seen by social work.  Uncomplicated postpartum care. pt cleared for discharge by social work. Pt cleared for dc home

## 2022-01-09 NOTE — DISCHARGE NOTE OB - NS MD DC FALL RISK RISK
For information on Fall & Injury Prevention, visit: [email or web address] OR  [email or web address] OR  https://www.cdc.gov/steadi/patient.html

## 2022-01-09 NOTE — DISCHARGE NOTE OB - MEDICATION SUMMARY - MEDICATIONS TO TAKE
I will START or STAY ON the medications listed below when I get home from the hospital:    ibuprofen 600 mg oral tablet  -- 1 tab(s) by mouth every 6 hours  -- Do not take this drug if you are pregnant.  It is very important that you take or use this exactly as directed.  Do not skip doses or discontinue unless directed by your doctor.  May cause drowsiness or dizziness.  Obtain medical advice before taking any non-prescription drugs as some may affect the action of this medication.  Take with food or milk.    -- Indication: For pain    ferrous sulfate 325 mg (65 mg elemental iron) oral tablet  -- 1 tab(s) by mouth 2 times a day  -- Check with your doctor before becoming pregnant.  Do not chew, break, or crush.  May discolor urine or feces.    -- Indication: For Supplement    Colace 100 mg oral capsule  -- 1 cap(s) by mouth 2 times a day  -- Medication should be taken with plenty of water.    -- Indication: For constipation

## 2022-01-09 NOTE — DISCHARGE NOTE OB - IF BREASTFEEDING, ADD A TOTAL OF 500 EXTRA CALORIES EACH DAY
Well , 9 Years Old  Well-child exams are recommended visits with a health care provider to track your child's growth and development at certain ages. This sheet tells you what to expect during this visit.  Recommended immunizations  · Tetanus and diphtheria toxoids and acellular pertussis (Tdap) vaccine. Children 7 years and older who are not fully immunized with diphtheria and tetanus toxoids and acellular pertussis (DTaP) vaccine:  ? Should receive 1 dose of Tdap as a catch-up vaccine. It does not matter how long ago the last dose of tetanus and diphtheria toxoid-containing vaccine was given.  ? Should receive the tetanus diphtheria (Td) vaccine if more catch-up doses are needed after the 1 Tdap dose.  · Your child may get doses of the following vaccines if needed to catch up on missed doses:  ? Hepatitis B vaccine.  ? Inactivated poliovirus vaccine.  ? Measles, mumps, and rubella (MMR) vaccine.  ? Varicella vaccine.  · Your child may get doses of the following vaccines if he or she has certain high-risk conditions:  ? Pneumococcal conjugate (PCV13) vaccine.  ? Pneumococcal polysaccharide (PPSV23) vaccine.  · Influenza vaccine (flu shot). Starting at age 6 months, your child should be given the flu shot every year. Children between the ages of 6 months and 8 years who get the flu shot for the first time should get a second dose at least 4 weeks after the first dose. After that, only a single yearly (annual) dose is recommended.  · Hepatitis A vaccine. Children who did not receive the vaccine before 2 years of age should be given the vaccine only if they are at risk for infection, or if hepatitis A protection is desired.  · Meningococcal conjugate vaccine. Children who have certain high-risk conditions, are present during an outbreak, or are traveling to a country with a high rate of meningitis should be given this vaccine.  Your child may receive vaccines as individual doses or as more than one vaccine  together in one shot (combination vaccines). Talk with your child's health care provider about the risks and benefits of combination vaccines.  Testing  Vision    · Have your child's vision checked every 2 years, as long as he or she does not have symptoms of vision problems. Finding and treating eye problems early is important for your child's development and readiness for school.  · If an eye problem is found, your child may need to have his or her vision checked every year (instead of every 2 years). Your child may also:  ? Be prescribed glasses.  ? Have more tests done.  ? Need to visit an eye specialist.  Other tests    · Talk with your child's health care provider about the need for certain screenings. Depending on your child's risk factors, your child's health care provider may screen for:  ? Growth (developmental) problems.  ? Hearing problems.  ? Low red blood cell count (anemia).  ? Lead poisoning.  ? Tuberculosis (TB).  ? High cholesterol.  ? High blood sugar (glucose).  · Your child's health care provider will measure your child's BMI (body mass index) to screen for obesity.  · Your child should have his or her blood pressure checked at least once a year.  General instructions  Parenting tips  · Talk to your child about:  ? Peer pressure and making good decisions (right versus wrong).  ? Bullying in school.  ? Handling conflict without physical violence.  ? Sex. Answer questions in clear, correct terms.  · Talk with your child's teacher on a regular basis to see how your child is performing in school.  · Regularly ask your child how things are going in school and with friends. Acknowledge your child's worries and discuss what he or she can do to decrease them.  · Recognize your child's desire for privacy and independence. Your child may not want to share some information with you.  · Set clear behavioral boundaries and limits. Discuss consequences of good and bad behavior. Praise and reward positive  behaviors, improvements, and accomplishments.  · Correct or discipline your child in private. Be consistent and fair with discipline.  · Do not hit your child or allow your child to hit others.  · Give your child chores to do around the house and expect them to be completed.  · Make sure you know your child's friends and their parents.  Oral health  · Your child will continue to lose his or her baby teeth. Permanent teeth should continue to come in.  · Continue to monitor your child's tooth-brushing and encourage regular flossing. Your child should brush two times a day (in the morning and before bed) using fluoride toothpaste.  · Schedule regular dental visits for your child. Ask your child's dentist if your child needs:  ? Sealants on his or her permanent teeth.  ? Treatment to correct his or her bite or to straighten his or her teeth.  · Give fluoride supplements as told by your child's health care provider.  Sleep  · Children this age need 9-12 hours of sleep a day. Make sure your child gets enough sleep. Lack of sleep can affect your child's participation in daily activities.  · Continue to stick to bedtime routines. Reading every night before bedtime may help your child relax.  · Try not to let your child watch TV or have screen time before bedtime. Avoid having a TV in your child's bedroom.  Elimination  · If your child has nighttime bed-wetting, talk with your child's health care provider.  What's next?  Your next visit will take place when your child is 10 years old.  Summary  · Discuss the need for immunizations and screenings with your child's health care provider.  · Ask your child's dentist if your child needs treatment to correct his or her bite or to straighten his or her teeth.  · Encourage your child to read before bedtime. Try not to let your child watch TV or have screen time before bedtime. Avoid having a TV in your child's bedroom.  · Recognize your child's desire for privacy and independence.  Your child may not want to share some information with you.  This information is not intended to replace advice given to you by your health care provider. Make sure you discuss any questions you have with your health care provider.  Document Revised: 04/07/2020 Document Reviewed: 07/27/2018  Elsevier Patient Education © 2021 [employer]     Statement Selected

## 2022-01-10 VITALS
SYSTOLIC BLOOD PRESSURE: 101 MMHG | RESPIRATION RATE: 17 BRPM | TEMPERATURE: 98 F | DIASTOLIC BLOOD PRESSURE: 68 MMHG | OXYGEN SATURATION: 100 % | HEART RATE: 67 BPM

## 2022-01-10 LAB
C TRACH RRNA SPEC QL NAA+PROBE: SIGNIFICANT CHANGE UP
HCT VFR BLD CALC: 27.5 % — LOW (ref 34.5–45)
HGB BLD-MCNC: 9.1 G/DL — LOW (ref 11.5–15.5)
MCHC RBC-ENTMCNC: 28.3 PG — SIGNIFICANT CHANGE UP (ref 27–34)
MCHC RBC-ENTMCNC: 33.1 GM/DL — SIGNIFICANT CHANGE UP (ref 32–36)
MCV RBC AUTO: 85.7 FL — SIGNIFICANT CHANGE UP (ref 80–100)
N GONORRHOEA RRNA SPEC QL NAA+PROBE: SIGNIFICANT CHANGE UP
NRBC # BLD: 0 /100 WBCS — SIGNIFICANT CHANGE UP (ref 0–0)
PLATELET # BLD AUTO: 322 K/UL — SIGNIFICANT CHANGE UP (ref 150–400)
RBC # BLD: 3.21 M/UL — LOW (ref 3.8–5.2)
RBC # FLD: 13.5 % — SIGNIFICANT CHANGE UP (ref 10.3–14.5)
SPECIMEN SOURCE: SIGNIFICANT CHANGE UP
WBC # BLD: 15.39 K/UL — HIGH (ref 3.8–10.5)
WBC # FLD AUTO: 15.39 K/UL — HIGH (ref 3.8–10.5)

## 2022-01-10 PROCEDURE — 86703 HIV-1/HIV-2 1 RESULT ANTBDY: CPT

## 2022-01-10 PROCEDURE — 87389 HIV-1 AG W/HIV-1&-2 AB AG IA: CPT

## 2022-01-10 PROCEDURE — 86762 RUBELLA ANTIBODY: CPT

## 2022-01-10 PROCEDURE — 85730 THROMBOPLASTIN TIME PARTIAL: CPT

## 2022-01-10 PROCEDURE — 86780 TREPONEMA PALLIDUM: CPT

## 2022-01-10 PROCEDURE — 59025 FETAL NON-STRESS TEST: CPT

## 2022-01-10 PROCEDURE — 86850 RBC ANTIBODY SCREEN: CPT

## 2022-01-10 PROCEDURE — 85025 COMPLETE CBC W/AUTO DIFF WBC: CPT

## 2022-01-10 PROCEDURE — 36415 COLL VENOUS BLD VENIPUNCTURE: CPT

## 2022-01-10 PROCEDURE — 86900 BLOOD TYPING SEROLOGIC ABO: CPT

## 2022-01-10 PROCEDURE — G0463: CPT

## 2022-01-10 PROCEDURE — 88307 TISSUE EXAM BY PATHOLOGIST: CPT

## 2022-01-10 PROCEDURE — 86901 BLOOD TYPING SEROLOGIC RH(D): CPT

## 2022-01-10 PROCEDURE — 80307 DRUG TEST PRSMV CHEM ANLYZR: CPT

## 2022-01-10 PROCEDURE — 84112 EVAL AMNIOTIC FLUID PROTEIN: CPT

## 2022-01-10 PROCEDURE — 87340 HEPATITIS B SURFACE AG IA: CPT

## 2022-01-10 PROCEDURE — 85027 COMPLETE CBC AUTOMATED: CPT

## 2022-01-10 PROCEDURE — 87591 N.GONORRHOEAE DNA AMP PROB: CPT

## 2022-01-10 PROCEDURE — 85610 PROTHROMBIN TIME: CPT

## 2022-01-10 PROCEDURE — 87635 SARS-COV-2 COVID-19 AMP PRB: CPT

## 2022-01-10 PROCEDURE — 85384 FIBRINOGEN ACTIVITY: CPT

## 2022-01-10 PROCEDURE — 87491 CHLMYD TRACH DNA AMP PROBE: CPT

## 2022-01-10 RX ORDER — FERROUS SULFATE 325(65) MG
1 TABLET ORAL
Qty: 60 | Refills: 0
Start: 2022-01-10 | End: 2022-02-08

## 2022-01-10 RX ORDER — DOCUSATE SODIUM 100 MG
1 CAPSULE ORAL
Qty: 60 | Refills: 0
Start: 2022-01-10 | End: 2022-02-08

## 2022-01-10 RX ORDER — IBUPROFEN 200 MG
1 TABLET ORAL
Qty: 120 | Refills: 0
Start: 2022-01-10 | End: 2022-02-08

## 2022-01-10 RX ADMIN — SODIUM CHLORIDE 3 MILLILITER(S): 9 INJECTION INTRAMUSCULAR; INTRAVENOUS; SUBCUTANEOUS at 05:53

## 2022-01-10 RX ADMIN — Medication 600 MILLIGRAM(S): at 01:00

## 2022-01-10 RX ADMIN — Medication 600 MILLIGRAM(S): at 06:30

## 2022-01-10 RX ADMIN — Medication 600 MILLIGRAM(S): at 13:04

## 2022-01-10 RX ADMIN — Medication 600 MILLIGRAM(S): at 06:00

## 2022-01-10 RX ADMIN — Medication 600 MILLIGRAM(S): at 00:30

## 2022-01-10 RX ADMIN — Medication 1 TABLET(S): at 13:03

## 2022-01-10 NOTE — PROGRESS NOTE ADULT - SUBJECTIVE AND OBJECTIVE BOX
Patient seen at bedside resting comfortably offers no current complaints. Ambulating and voiding without difficulty.  Passing flatus and tolerating regular diet.  both breast/bottle feeding . Denies HA, CP, SOB, N/V/D, dizziness, palpitations, worsening abdominal pain, worsening vaginal bleeding, or any other concerns.    Vital Signs Last 24 Hrs  T(C): 36.6 (10 Oscar 2022 08:54), Max: 36.9 (2022 19:24)  T(F): 97.9 (10 Oscar 2022 08:54), Max: 98.5 (2022 19:24)  HR: 73 (10 Oscar 2022 08:54) (68 - 89)  BP: 114/78 (10 Oscar 2022 08:54) (106/63 - 136/86)  BP(mean): 72 (10 Oscar 2022 00:40) (72 - 105)  RR: 17 (10 Oscar 2022 08:54) (16 - 18)  SpO2: 100% (10 Oscar 2022 08:54) (97% - 100%)    Physical Exam:     Gen: A&Ox 3, NAD  Chest: CTA B/L  Cardiac: S1+S2; RRR  Breast: Soft, nontender, nonengorged  Abdomen: +Bs; Soft, nontender,  ND; Fundus firm below umbilicus  Gyn: mod lochia, intact/repaired  Ext: Nontender, DTRS 2+, no worsening edema                          9.1    15.39 )-----------( 322      ( 10 Oscar 2022 07:19 )             27.5       A/P: 26 year old PPD#1 s/p , +utox, hx of anxiety/depression/bipolar     -social work consult   -psych consult   -Continue pain management  -Encourage OOB and ambulation  -Check CBC  -Continue current care  -Plan for discharge tomorrow  -d/w dr. Clinton

## 2022-01-10 NOTE — CHART NOTE - NSCHARTNOTEFT_GEN_A_CORE
[facility] EVENT NOTE     Pt was seen by social work and cleared for discharge   Psych currently unavailable, pt stable, will follow up outpatient in office   case d/w Dr. Umanzor, cleared for discharge

## 2022-01-10 NOTE — PROGRESS NOTE ADULT - PROBLEM SELECTOR PLAN 1
-social work consult   -psych consult   -Continue pain management  -Encourage OOB and ambulation  -Check CBC  -Continue current care  -Plan for discharge tomorrow  -d/w dr. Clinton

## 2022-01-11 ENCOUNTER — APPOINTMENT (OUTPATIENT)
Dept: ANTEPARTUM | Facility: CLINIC | Age: 27
End: 2022-01-11

## 2022-01-19 NOTE — ED PROVIDER NOTE - CROS ED ROS STATEMENT
"Daily Assessment:  Nishi is presenting for follow-up of sacral pain and pubic symphysis pain in pregnancy. She is subjectively reporting improvement following the first visit. PT continued with the manual therapy and progressed home program to include further spine stabilization. PT also gave some suggestions on positioning for home.      Date 1/19/22 12/3/21/21   Exercise     supine butterfly stretch  2 x 30\"   SL reach and roll X 5 hold 5 seconds X 5 hold 5 seconds   All 4's  1) rocking x 5  2) Cat/cow  3)alt arm/leg #3 x 5 #1-2 x 10                                                      "
all other ROS negative except as per HPI

## 2022-05-02 NOTE — ED PROVIDER NOTE - EYES, MLM
Patient Education       Well Child Exam 6 Months   About this topic   Your baby's 6-month well child exam is a visit with the doctor to check your baby's health. The doctor measures your baby's weight, height, and head size. The doctor plots these numbers on a growth curve. The growth curve gives a picture of your baby's growth at each visit. The doctor may listen to your baby's heart, lungs, and belly. Your doctor will do a full exam of your baby from the head to the toes.  Your baby may also need shots or blood tests during this visit.  General   Growth and Development   Your doctor will ask you how your baby is developing. The doctor will focus on the skills that most children your baby's age are expected to do. During the first months of your baby's life, here are some things you can expect.  Movement ? Your baby may:  Begin to sit up without help  Move a toy from one hand to the other  Roll from front to back and back to front  Use the legs to stand with your help  Be able to move forward or backward while on the belly  Become more mobile  Put everything in the mouth  Never leave small objects within reach.  Do not feed your baby hot dogs or hard food that could lead to choking.  Cut all food into small pieces.  Learn what to do if your baby chokes.  Hearing, seeing, and talking ? Your baby will likely:  Make lots of babbling noises  May say things like da-da-da or ba-ba-ba or ma-ma-ma  Show a wide range of emotions on the face  Be more comfortable with familiar people and toys  Respond to their own name  Likes to look at self in mirror  Feeding ? Your baby:  Takes breast milk or formula for most nutrition. Always hold your baby when feeding. Do not prop a bottle. Propping the bottle makes it easier for your baby to choke and get ear infections.  May be ready to start eating cereal and other baby foods. Signs your baby is ready are when your baby:  Sits without much support  Has good head and neck  control  Shows interest in food you are eating  Opens the mouth for a spoon  Able to grasp and bring things up to mouth  Can start to eat thin cereal or pureed meats. Then, add fruits and vegetables.  Do not add cereal to your baby's bottle. Feed it to your baby with a spoon.  Do not force your baby to eat baby foods. You may have to offer a food more than 10 times before your baby will like it.  It is OK to try giving your baby very small bites of soft finger foods like bananas or well cooked vegetables. If your baby coughs or chokes, then try again another time.  Watch for signs your baby is full like turning the head or leaning back.  May start to have teeth. If so, brush them 2 times each day with a smear of toothpaste. Use a cold clean wash cloth or teething ring to help ease sore gums.  Will need you to clean the teeth after a feeding with a wet washcloth or a wet baby toothbrush. You may use a smear of toothpaste each day.  Sleep ? Your baby:  Should still sleep in a safe crib, on the back, alone for naps and at night. Keep soft bedding, bumpers, loose blankets, and toys out of your baby's bed. It is OK if your baby rolls over without help at night.  Is likely sleeping about 6 to 8 hours in a row at night  Needs 2 to 3 naps each day  Sleeps about a total of 14 to 15 hours each day  Needs to learn how to fall asleep without help. Put your baby to bed while still awake. Your baby may cry. Check on your baby every 10 minutes or so until your baby falls asleep. Your baby will slowly learn to fall asleep.  Should not have a bottle in bed. This can cause tooth decay or ear infections. Give a bottle before putting your baby in the crib for the night.  Should sleep in a crib that is away from windows.  Shots or vaccines ? It is important for your baby to get shots on time. This protects from very serious illnesses like lung infections, meningitis, or infections that damage their nervous system. Your baby may  need:  DTaP or diphtheria, tetanus, and pertussis vaccine  Hib or Haemophilus influenzae type b vaccine  IPV or polio vaccine  PCV or pneumococcal conjugate vaccine  RV or rotavirus vaccine  HepB or hepatitis B vaccine  Influenza vaccine  Some of these vaccines may be given as combined vaccines. This means your child may get fewer shots.  Help for Parents   Play with your baby.  Tummy time is still important. It helps your baby develop arm and shoulder muscles. Do tummy time a few times each day while your baby is awake. Put a colorful toy in front of your baby to give something to look at or play with.  Read to your baby. Talk and sing to your baby. This helps your baby learn language skills.  Give your child toys that are safe to chew on. Most things will end up in your child's mouth, so keep away small objects and plastic bags.  Play peekaboo with your baby.  Here are some things you can do to help keep your baby safe and healthy.  Do not allow anyone to smoke in your home or around your baby. Second hand smoke can harm your baby.  Have the right size car seat for your baby and use it every time your baby is in the car. Your baby should be rear facing until 2 years of age.  Keep one hand on the baby whenever you are changing a diaper or clothes.  Keep your baby in the shade, rather than in the sun. Doctors dont recommend sunscreen until children are 6 months and older.  Take extra care if your baby is in the kitchen.  Make sure you use the back burners on the stove and turn pot handles so your baby cannot grab them.  Keep hot items like liquids, coffee pots, and heaters away from your baby.  Put childproof locks on cabinets, especially those that contain cleaning supplies or other things that may harm your baby.  Limit how much time your baby spends in an infant seat, bouncy seat, boppy chair, or swing. Give your baby a safe place to play.  Remove or protect sharp edge furniture where your child plays.  Use  safety latches on drawers and cabinets.  Keep cords from shades and blinds away as they can strangle your child.  Never leave your baby alone. Do not leave your child in the car, in the bath, or at home alone, even for a few minutes.  Avoid screen time for children under 2 years old. This means no TV, computers, or video games. They can cause problems with brain development.  Parents need to think about:  How you will handle a sick child. Do you have alternate day care plans? Can you take off work or school?  How to childproof your home. Look for areas that may be a danger to a young child. Keep choking hazards, poisons, and hot objects out of a child's reach.  Do you live in an older home that may need to be tested for lead?  Your next well child visit will most likely be when your baby is 9 months old. At this visit your doctor may:  Do a full check up on your baby  Talk about how your baby is sleeping and eating  Give your baby the next set of shots  Get their vision checked.         When do I need to call the doctor?   Fever of 100.4°F (38°C) or higher  Having problems eating or spits up a lot  Sleeps all the time or has trouble sleeping  Won't stop crying  You are worried about your baby's development  Where can I learn more?   American Academy of Pediatrics  https://www.healthychildren.org/English/ages-stages/baby/Pages/Hearing-and-Making-Sounds.aspx   American Academy of Pediatrics  https://www.healthychildren.org/English/ages-stages/toddler/Pages/Milestones-During-The-First-2-Years.aspx   Centers for Disease Control and Prevention  https://www.cdc.gov/ncbddd/actearly/milestones/   Centers for Disease Control and Prevention  https://www.cdc.gov/vaccines/parents/downloads/ckjvgm-krt-mnk-0-6yrs.pdf   Last Reviewed Date   2021  Consumer Information Use and Disclaimer   This information is not specific medical advice and does not replace information you receive from your health care provider. This is only a  "brief summary of general information. It does NOT include all information about conditions, illnesses, injuries, tests, procedures, treatments, therapies, discharge instructions or life-style choices that may apply to you. You must talk with your health care provider for complete information about your health and treatment options. This information should not be used to decide whether or not to accept your health care providers advice, instructions or recommendations. Only your health care provider has the knowledge and training to provide advice that is right for you.  Copyright   Copyright © 2021 UpTo[employer], Inc. and its affiliates and/or licensors. All rights reserved.    Children under the age of 2 years will be restrained in a rear facing child safety seat.   If you have an active [employer] account, please look for your well child questionnaire to come to your [employer] account before your next well child visit.    Starting solid foods    Introducing solid foods into a baby's diet has varied throughout history and from culture to culture.  Solid foods are intended as a supplement to breast milk or formula for babies under a year old, not a replacement.  Current recommendations from the AAP advise starting solids when your baby is able to:    Sit with assistance  Have good head control  Seem interested in food/spoon when it's close to their mouth  Turn away when they don't want to eat any more    This usually occurs around 6 months.      It is important to provide the appropriate environment for meals.  Distractions such as the TV should be minimized.  Your baby should not be overly tired or hungry.  The baby's first attempt at eating solids may take awhile, make sure you have time for the feeding and will not be rushed.    There is no "one best food" to start with despite from what you might have heard from spouses, siblings, grandparents, second cousins,  providers, or TV advertisements.      Some good " "first foods include cereals, fruits, vegetables, or meats  Mix 1-4 tablespoons of iron fortified cereal with breast milk or formula.  Initially it will be mixed to a thin consistency and thickened as the baby adjusts to solid foods.     Start with pureed baby foods that have only one ingredient (no blends)  Solids can be mixed with a small amount of formula or breast milk at first, then advanced to baby food alone  Try solids once per day to start, then advance to 2 or 3 feeds each day  Wait 3-5 days before starting another new food - this gives time to see if your baby will have any sort of reaction to the last food    Advancing Foods  Baby foods bought at the store often have "stages" - first, second, and third - based on how finely mashed or chopped up the foods are:    Stage 1 foods (4-7 months) are completely pureed with a single ingredient  Stage 2 foods (7-8 months) are pureed or strained, often with 2 or more ingredients  Stage 3 foods (8-12 months) require chewing and have more texture    Making your own baby foods is also an option, and some guidelines from the USDA can be found here: http://www.fns.usda.gov/tn/Resources/feedinginfants-ch12.pdf    Finger foods can be introduced when your baby is able to sit up on their own and bring their hands to their mouth, usually around 8-9 months.  Finger foods should be soft, easily "smoosh-able," and finely cut or chopped up.  Some examples are small pieces of ripe banana, Cheerios, cooked pasta, or scrambled eggs.    Foods to Avoid  When in doubt, ask the doctor!  These are some foods to definitely avoid during infancy:    Hard, round foods (hard candies, nuts, popcorn, grapes, raw carrots, raisins, hot dogs or sausage, etc.)  Cow's milk until over 1 year of age  Honey until over 1 year of age            FEEDING GUIDELINES: BIRTH TO ONE YEAR  AGE BREAST MILK FORMULA GRAINS FRUITS and  VEGETABLES PROTEIN TIPS   0-1 MONTH Frequent feedings, generally every 2-3 hours " with 8-10 feedings a day Feed every 3-4 hours with 6-8 feedings a day. 2-3 oz per feeding NONE NONE Formula and breast milk Infants feeding schedules and volumes vary.  Feed on demand.  No water should be given prior to 6 months.  Breast fed infants will need to be supplemented with 400 IU of Vitamin D   1-6 MONTHS   Feed on Demand  Frequent feedings  Generally 6-8 feedings a day.   Feed approximately Every 4 hours 24-32oz a day NONE NONE Formula and breast milk   The number of feedings will decrease as the baby sleeps longer at night.   6-7  MONTHS On demand  Usually six feedings a day. Four to six 6-8 oz feedings a day. Iron fortified rice cereal followed by other grains. Mix 1-4 TBLS with breast milk or formula. Advance to two servings a day. Finely pureed cooked fruits and vegetables. Introduce a new food every 2-3 days servings a day. Approximately ½ cup a day.   Pureed meats, chicken and fish  Egg yolk, plain yogurt   The American Academy of Pediatrics recommends breast feeding exclusively until 6 months of age.   7-8 MONTHS On demand generally 4-5 feedings a day 4-5 feedings  24-32 oz a day Iron fortified cereal twice a day. Approximately 1 cup a day divided into 2-3 servings Pureed meats, chicken and fish  Egg yolk, plain yogurt  Cooked dried beans Introduce new foods and textures.  4- 6 oz of juice can be introduced.  Introduce a sippy cup   8-10 MONTHS On demand   16-32 oz per day  3-4 feedings Infant cereals  Toast, waffles, unsweetened cereals. Strained and mashed vegetables. (1-2 servings)  Pieces of soft fruits (1-2 servings) Finely chopped meat, chicken, eggs and fish. Yogurt, cheese Introduce textures  Begin finger foods   10-12 MONTHS On demand 16-24oz  3-4 feedings Unsweetened cereal, rice, pasta, bread, waffles, bagels  2 servings a day   Cooked vegetable pieces.    2 servings a day Small tender pieces of meat chicken or fish.  Eggs, yogurt, cheese and beans.  2-3 servings a day   Encourage self  feeding  Three meals and two snacks  a day        Clear bilaterally, pupils equal, round and reactive to light.

## 2024-07-18 NOTE — ED ADULT NURSE NOTE - OBJECTIVE STATEMENT
No
Pt. stated that she has been throwing up, fever, and feeling pain for the past 5 days. Pt. stated that she hasn't been able to have bowel movement.

## 2025-06-18 NOTE — PATIENT PROFILE OB - FUNCTIONAL ASSESSMENT - BASIC MOBILITY SCORE HIDDEN
[de-identified] : L spine   No rashes, erythema, edema noted TTP b/l lumbar paraspinal muscles Facet loading negative LLE: 5/5 strength RLE: 5/5 strength Sensation intact b/l LE    C spine  No rashes, erythema, edema noted TTP b/l cervical paraspinal and trapezius muscles spurlings negative bilaterally  RUE: 5/5 strength LUE: 5/5 strength   L hip  No rashes, erythema, edema  TTP at anterior hip, gluteus, GTB Stability: no gross instability  ROM: Painful ROM  JOE + Gait: limping with weight bearing  T spine  No rashes, erythema, edema noted TTP b/l thoracic paraspinals and rhomboid muscles Limited ROM with neck/back flexion and extension 2/2 to pain 
21